# Patient Record
Sex: FEMALE | Race: WHITE | URBAN - METROPOLITAN AREA
[De-identification: names, ages, dates, MRNs, and addresses within clinical notes are randomized per-mention and may not be internally consistent; named-entity substitution may affect disease eponyms.]

---

## 2017-06-28 ENCOUNTER — HOSPITAL ENCOUNTER (EMERGENCY)
Facility: CLINIC | Age: 22
Discharge: HOME | End: 2017-06-28
Attending: EMERGENCY MEDICINE | Admitting: EMERGENCY MEDICINE
Payer: COMMERCIAL

## 2017-06-28 VITALS
RESPIRATION RATE: 18 BRPM | SYSTOLIC BLOOD PRESSURE: 117 MMHG | WEIGHT: 140 LBS | TEMPERATURE: 98.2 F | OXYGEN SATURATION: 100 % | BODY MASS INDEX: 22.5 KG/M2 | HEIGHT: 66 IN | DIASTOLIC BLOOD PRESSURE: 56 MMHG | HEART RATE: 91 BPM

## 2017-06-28 DIAGNOSIS — T78.40XA ALLERGIC REACTION, INITIAL ENCOUNTER: ICD-10-CM

## 2017-06-28 PROCEDURE — 99283 EMERGENCY DEPT VISIT LOW MDM: CPT

## 2017-06-28 PROCEDURE — 25000132 ZZH RX MED GY IP 250 OP 250 PS 637: Performed by: EMERGENCY MEDICINE

## 2017-06-28 RX ORDER — DIPHENHYDRAMINE HCL 25 MG
50 CAPSULE ORAL ONCE
Status: COMPLETED | OUTPATIENT
Start: 2017-06-28 | End: 2017-06-28

## 2017-06-28 RX ADMIN — DIPHENHYDRAMINE HYDROCHLORIDE 50 MG: 25 CAPSULE ORAL at 21:12

## 2017-06-28 ASSESSMENT — ENCOUNTER SYMPTOMS
SHORTNESS OF BREATH: 0
COUGH: 0
TROUBLE SWALLOWING: 0

## 2017-06-28 NOTE — ED AVS SNAPSHOT
Emergency Department    [address] [place] [address]    Phone:  [phone]    Fax:  [phone]                                       Renetta Castro   MRN: [id number]    Department:   Emergency Department   Date of Visit:  6/28/2017           After Visit Summary Signature Page     I have received my discharge instructions, and my questions have been answered. I have discussed any challenges I see with this plan with the nurse or doctor.    ..........................................................................................................................................  Patient/Patient Representative Signature      ..........................................................................................................................................  Patient Representative Print Name and Relationship to Patient    ..................................................               ................................................  Date                                            Time    ..........................................................................................................................................  Reviewed by Signature/Title    ...................................................              ..............................................  Date                                                            Time

## 2017-06-28 NOTE — ED AVS SNAPSHOT
Emergency Department    [address] [facility] [address]    Phone:  [phone]    Fax:  [phone]                                       Renetta Castro   MRN: [id number]    Department:   Emergency Department   Date of Visit:  6/28/2017           Patient Information     Date Of Birth          1995        Your diagnoses for this visit were:     Allergic reaction, initial encounter        You were seen by Melissa Springer MD.      Follow-up Information     Follow up with Saundra Watson NP In 2 days.    Why:  benadryl 1-2 tabs every 6 hours. cold compress to eye lids. Return if any difficulty breathing, throat swelling.    Contact information:    [facility][1]  [address] [facility] [id number] [phone]        Discharge References/Attachments     ALLERGIC REACTION, OTHER (LOCAL) (ENGLISH)      24 Hour Appointment Hotline       To make an appointment at any [facility], call [phone] ([phone]). If you don't have a family doctor or clinic, we will help you find one. [facility] are conveniently located to serve the needs of you and your family.             Review of your medicines      Notice     You have not been prescribed any medications.            Orders Needing Specimen Collection     None      Pending Results     No orders found from 6/26/2017 to 6/29/2017.            Pending Culture Results     No orders found from 6/26/2017 to 6/29/2017.            Pending Results Instructions     If you had any lab results that were not finalized at the time of your Discharge, you can call the ED Lab Result RN at [phone]. You will be contacted by this team for any positive Lab results or changes in treatment. The nurses are available 7 days a week from 10A to 6:30P.  You can leave a message 24 hours per day and they will return your call.        Test Results From Your Hospital Stay               Clinical Quality Measure: Blood Pressure  Screening     Your blood pressure was checked while you were in the emergency department today. The last reading we obtained was  BP: 117/56 . Please read the guidelines below about what these numbers mean and what you should do about them.  If your systolic blood pressure (the top number) is less than 120 and your diastolic blood pressure (the bottom number) is less than 80, then your blood pressure is normal. There is nothing more that you need to do about it.  If your systolic blood pressure (the top number) is 120-139 or your diastolic blood pressure (the bottom number) is 80-89, your blood pressure may be higher than it should be. You should have your blood pressure rechecked within a year by a primary care provider.  If your systolic blood pressure (the top number) is 140 or greater or your diastolic blood pressure (the bottom number) is 90 or greater, you may have high blood pressure. High blood pressure is treatable, but if left untreated over time it can put you at risk for heart attack, stroke, or kidney failure. You should have your blood pressure rechecked by a primary care provider within the next 4 weeks.  If your provider in the emergency department today gave you specific instructions to follow-up with your doctor or provider even sooner than that, you should follow that instruction and not wait for up to 4 weeks for your follow-up visit.        Thank you for choosing [facility]       Thank you for choosing [facility] for your care. Our goal is always to provide you with excellent care. Hearing back from our patients is one way we can continue to improve our services. Please take a few minutes to complete the written survey that you may receive in the mail after you visit with us. Thank you!        [employer] Information     [employer] lets you send messages to your doctor, view your test results, renew your prescriptions, schedule appointments and more. To sign up, go to [email or web address] . Click on  "\"Log in\" on the left side of the screen, which will take you to the Welcome page. Then click on \"Sign up Now\" on the right side of the page.     You will be asked to enter the access code listed below, as well as some personal information. Please follow the directions to create your username and password.     Your access code is: [email or web address]  Expires: 2017 10:05 PM     Your access code will  in 90 days. If you need help or a new code, please call your [place] clinic or [phone].        Care EveryWhere ID     This is your Care EveryWhere ID. This could be used by other organizations to access your [place] medical records  [id number]        Equal Access to Services     BAO FALCON : Han Robles, wakayla copeland, qabambi kaalmagraciela gee, sav guillen. So [facility] [phone].    ATENCIÓN: Si habla español, tiene a merida disposición servicios gratuitos de asistencia lingüística. Llame al [phone].    We comply with applicable federal civil rights laws and [place] laws. We do not discriminate on the basis of race, color, national origin, age, disability sex, sexual orientation or gender identity.            After Visit Summary       This is your record. Keep this with you and show to your community pharmacist(s) and doctor(s) at your next visit.                  "

## 2017-06-29 NOTE — ED PROVIDER NOTES
History     Chief Complaint:  Allergic reaction    HPI   Renetta Castro is a 21 year old female with Multiple sclerosis who presents to the emergency department today for evaluation of an allergic reaction. The patient reports that she arrived at her friends house a few hours ago, ws feeling fine, and was playing with her friend's dog (a rogelio abelu/donnyeradelian cross) when her eyes began to feel itchy along with her face and the front of her neck. The patient states that she has had allergic reactions to dogs in the past but that she has never tested positive for a dog allergy and that she has played with this specific dog before with no allergic reaction. She reports that her itching subsided after she took a Claritin. She denies any itching on her hands, arms, or legs. She reports no cough, shortness of breath, or difficulty swallowing. She states that she has used no new makeup, soap, or lotion and has no known food allergies. The patient's friend notes that the dog was recently washed several days ago with dog shampoo. The patient's primary care clinic is [facility].     Allergies:  Sulfa Drugs     Medications:    Medications reviewed. No current outpatient medications.    Past Medical History:    Allergic state  Multiple sclerosis    Past Surgical History:    History reviewed. No pertinent surgical history.    Family History:    History reviewed. No pertinent family history.    Social History:  The patient was accompanied to the ED by a friend.  Smoking Status: Never Smoker  Smokeless Tobacco: Never Used  Alcohol Use: Positive     Review of Systems   HENT: Negative for trouble swallowing.         Itchy eyes, face, and neck   Respiratory: Negative for cough and shortness of breath.    Skin:        Itchy eyes, face, and neck. No itchy hands, arms, or legs.   All other systems reviewed and are negative.    Physical Exam     Vitals:  Patient Vitals for the past 24 hrs:   BP Temp Temp src Pulse Heart Rate Resp  "SpO2 Height Weight   06/28/17 1942 117/56 98.2  F (36.8  C) Oral 91 91 18 100 % 1.676 m (5' 6\") 63.5 kg (140 lb)     Physical Exam   Constitutional:  Patient is oriented to person, place, and time. They appear well-developed and well-nourished.   HENT:    Patient has trace edema of her upper eyelids. There is no erythema. I don't appreciate any urticaria  Mouth/Throat:   Oropharynx is clear and moist.  No intraoral swelling.   Eyes:    Conjunctivae normal and EOM are normal. Pupils are equal, round, and reactive to light.   Neck:    Normal range of motion.   Cardiovascular: Normal rate, regular rhythm and normal heart sounds.  Exam reveals no gallop and no friction rub.  No murmur heard.  Pulmonary/Chest:  Effort normal and breath sounds normal. Patient has no wheezes. Patient has no rales. No stridor.  Musculoskeletal:  Normal range of motion.  Neurological:   Patient is alert and oriented to person, place, and time. Patient has normal strength. No cranial nerve deficit or sensory deficit. GCS 15  Skin:   Skin is warm and dry. No rash noted. No erythema.   Psychiatric:   Patient has a normal mood and affect. Patient's behavior is normal. Judgment and thought content normal.     Emergency Department Course     Interventions:  2112 Benadryl 50 mg PO    Emergency Department Course:  Nursing notes and vitals reviewed.  I performed an exam of the patient as documented above.   I discussed the treatment plan with the patient. They expressed understanding of this plan and consented to discharge. They will be discharged home with instructions for care and follow up. In addition, the patient will return to the emergency department if their symptoms persist, worsen, if new symptoms arise or if there is any concern.  All questions were answered.  I personally reviewed the physical exam results with the patient and answered all related questions prior to discharge.    Impression & Plan      Medical Decision Making:  Renetta STEIN" Matthew is a 21 year old female who presents with bilateral eyelid edema after coming into contact with her friend's dog. She has been intermittently allergic to dogs. There were no other provoking factors. She had no respiratory distress, no coughing, and no difficulty with swallowing. Her examination was very mild. I did give her Benadryl here and observed her here in the emergency department. She has been here for two hours and on recheck she appears to be normal. At this time, I suspect that the dog or the shampoo that was recently used on the dog may be causing the allergic reaction. She will avoid dogs and follow up with her allergist in 1-2 days. Tylenol over the next 1-2 days as well.     Diagnosis:    ICD-10-CM    1. Allergic reaction, initial encounter T78.40XA      Disposition:   The patient is discharged to home.    Scribe Disclosure:  I, Mina Arias, am serving as a scribe at 9:20 PM on 6/28/2017 to document services personally performed by Melissa Springer MD based on my observations and the provider's statements to me.     EMERGENCY DEPARTMENT       Melissa Springer MD  06/28/17 1670